# Patient Record
Sex: MALE | Race: WHITE
[De-identification: names, ages, dates, MRNs, and addresses within clinical notes are randomized per-mention and may not be internally consistent; named-entity substitution may affect disease eponyms.]

---

## 2020-01-24 ENCOUNTER — HOSPITAL ENCOUNTER (EMERGENCY)
Dept: HOSPITAL 46 - ED | Age: 75
Discharge: HOME | End: 2020-01-24
Payer: OTHER GOVERNMENT

## 2020-01-24 VITALS — BODY MASS INDEX: 36.58 KG/M2 | WEIGHT: 247.01 LBS | HEIGHT: 69 IN

## 2020-01-24 DIAGNOSIS — Z88.8: ICD-10-CM

## 2020-01-24 DIAGNOSIS — T83.098A: Primary | ICD-10-CM

## 2020-01-24 DIAGNOSIS — Z88.5: ICD-10-CM

## 2020-01-24 DIAGNOSIS — I10: ICD-10-CM

## 2020-01-24 DIAGNOSIS — F17.200: ICD-10-CM

## 2020-01-24 DIAGNOSIS — Z79.899: ICD-10-CM

## 2020-01-24 NOTE — XMS
PreManage Notification: NICOLA HERNÁNDEZ MRN:[id number]
 
Security Information
 
Security Events
No recent Security Events currently on file
 
 
 
CRITERIA MET
------------
- [facility] - 2 Visits in 30 Days
 
 
CARE PROVIDERS
-------------------------------------------------------------------------------------
Primary Care     Primary Care     Current
 
PHONE: Unknown
-------------------------------------------------------------------------------------
 
Asaf has no Care Guidelines for this patient.
 
ELO VISIT COUNT (12 MO.)
-------------------------------------------------------------------------------------
1 [facility] [place] [address]
-------------------------------------------------------------------------------------
TOTAL 3
-------------------------------------------------------------------------------------
NOTE: Visits indicate total known visits.
 
ED/UCC VISIT TRACKING (12 MO.)
-------------------------------------------------------------------------------------
01/24/2020 16:07
[facility]
 
TYPE: Emergency
 
COMPLAINT:
- CATHETER PROBLEM
-------------------------------------------------------------------------------------
01/02/2020 11:09
[facility]
 
TYPE: Emergency
 
COMPLAINT:
- URINE PROBLEM
 
DIAGNOSES:
- Essential (primary) hypertension
- Nicotine dependence, unspecified, uncomplicated
- Retention of urine, unspecified
-------------------------------------------------------------------------------------
[facility]2019 [facility]
 
DIAGNOSES:
- Headache (Adult - New Onset Or New Symptoms)
- Nausea
- Shrt jame walkert inject/tear, not ntrct
- headache
-------------------------------------------------------------------------------------
 
 
INPATIENT VISIT TRACKING (12 MO.)
No inpatient visits to display in this time frame
 
[email or web address]